# Patient Record
Sex: MALE | Race: WHITE | NOT HISPANIC OR LATINO | Employment: OTHER | URBAN - METROPOLITAN AREA
[De-identification: names, ages, dates, MRNs, and addresses within clinical notes are randomized per-mention and may not be internally consistent; named-entity substitution may affect disease eponyms.]

---

## 2022-01-03 ENCOUNTER — OFFICE VISIT (OUTPATIENT)
Dept: GASTROENTEROLOGY | Facility: CLINIC | Age: 77
End: 2022-01-03

## 2022-01-03 VITALS — WEIGHT: 190 LBS | DIASTOLIC BLOOD PRESSURE: 60 MMHG | SYSTOLIC BLOOD PRESSURE: 116 MMHG | HEART RATE: 66 BPM

## 2022-01-03 DIAGNOSIS — K62.5 RECTAL BLEEDING: Primary | ICD-10-CM

## 2022-01-03 DIAGNOSIS — D12.6 ADENOMATOUS POLYP OF COLON, UNSPECIFIED PART OF COLON: ICD-10-CM

## 2022-01-03 PROCEDURE — 99204 OFFICE O/P NEW MOD 45 MIN: CPT | Performed by: INTERNAL MEDICINE

## 2022-01-03 RX ORDER — HYDROCHLOROTHIAZIDE 12.5 MG/1
12.5 TABLET ORAL DAILY
COMMUNITY
Start: 2021-12-19

## 2022-01-03 RX ORDER — AMLODIPINE BESYLATE 10 MG/1
TABLET ORAL
COMMUNITY
Start: 2021-10-11

## 2022-01-03 RX ORDER — OMEPRAZOLE 40 MG/1
CAPSULE, DELAYED RELEASE ORAL
COMMUNITY

## 2022-01-03 RX ORDER — AMITRIPTYLINE HYDROCHLORIDE 10 MG/1
TABLET, FILM COATED ORAL
COMMUNITY

## 2022-01-03 RX ORDER — AMMONIUM LACTATE 12 G/100G
LOTION TOPICAL
COMMUNITY
Start: 2021-11-11

## 2022-01-03 RX ORDER — HYDROXYZINE HYDROCHLORIDE 25 MG/1
25-50 TABLET, FILM COATED ORAL
COMMUNITY
Start: 2021-12-27

## 2022-01-03 RX ORDER — ASPIRIN 81 MG/1
81 TABLET ORAL DAILY
COMMUNITY

## 2022-01-03 RX ORDER — MELOXICAM 15 MG/1
15 TABLET ORAL DAILY
COMMUNITY
Start: 2021-11-22

## 2022-01-03 RX ORDER — LISINOPRIL 10 MG/1
10 TABLET ORAL DAILY
COMMUNITY
Start: 2021-12-19

## 2022-01-03 RX ORDER — ATENOLOL 25 MG/1
25 TABLET ORAL DAILY
COMMUNITY
Start: 2021-11-21

## 2022-01-03 NOTE — PROGRESS NOTES
PCP:  Christophe Glez MD     No referring provider defined for this encounter.    Chief Complaint   Patient presents with   • Rectal Bleeding        HPI   Patient is a 76-year-old gentleman who had an episode of rectal bleeding.  He actually had several episodes with dark rectal bleeding beginning on 1231.  This was relatively painless other than the small amount of discomfort around the anal area.  He did not feel like he was tearing.  He was thought to possibly have hemorrhoids.  He had been evaluated in the emergency room at [facility].  His last colonoscopy was 9/4/2018.  This showed internal hemorrhoids as well as diverticulosis.  There was no evidence of colitis or polyps at that time.  He has a personal history of polyps.    No Known Allergies       Current Outpatient Medications:   •  aspirin 81 MG EC tablet, Take 81 mg by mouth Daily., Disp: , Rfl:   •  amitriptyline (ELAVIL) 10 MG tablet, amitriptyline 10 mg tablet, Disp: , Rfl:   •  amLODIPine (NORVASC) 10 MG tablet, , Disp: , Rfl:   •  ammonium lactate (LAC-HYDRIN) 12 % lotion, , Disp: , Rfl:   •  atenolol (TENORMIN) 25 MG tablet, Take 25 mg by mouth Daily., Disp: , Rfl:   •  betamethasone valerate (VALISONE) 0.1 % ointment, betamethasone valerate 0.1 % topical ointment  Apply sparingly to ear canals QD for one week, then Q week., Disp: , Rfl:   •  Diclofenac Sodium (VOLTAREN) 1 % gel gel, diclofenac 1 % topical gel, Disp: , Rfl:   •  hydroCHLOROthiazide (HYDRODIURIL) 12.5 MG tablet, Take 12.5 mg by mouth Daily., Disp: , Rfl:   •  hydrOXYzine (ATARAX) 25 MG tablet, Take 25-50 mg by mouth every night at bedtime., Disp: , Rfl:   •  lisinopril (PRINIVIL,ZESTRIL) 10 MG tablet, Take 10 mg by mouth Daily., Disp: , Rfl:   •  meloxicam (MOBIC) 15 MG tablet, Take 15 mg by mouth Daily., Disp: , Rfl:   •  omeprazole (priLOSEC) 40 MG capsule, omeprazole 40 mg capsule,delayed release  TAKE 1 CAPSULE BY MOUTH ONCE DAILY, Disp: , Rfl:      Past Medical History:    Diagnosis Date   • Hypertension        Past Surgical History:   Procedure Laterality Date   • CHOLECYSTECTOMY     • KNEE SURGERY Bilateral     knee replacement        Social History     Socioeconomic History   • Marital status:    Tobacco Use   • Smoking status: Never Smoker   • Smokeless tobacco: Never Used   Substance and Sexual Activity   • Alcohol use: Never   • Drug use: Never        Family History   Problem Relation Age of Onset   • Colon cancer Neg Hx    • Colon polyps Neg Hx         Review of Systems     Vitals:    01/03/22 1426   BP: 116/60   Pulse: 66        Physical Exam   General Appearance: Alert, in no acute distress   Head: Normocephalic, without obvious abnormality, atraumatic   Eyes: Lids and lashes normal, conjunctivae and sclerae normal, no icterus, no pallor, corneas clear, PERRLA   Ears: Ears appear intact with no abnormalities noted   Extremities: Moves all extremities well, no edema, no cyanosis, no redness   Skin: No bleeding, bruising or rash   Neurologic: Cranial nerves 2 - 12 grossly intact, no focal deficits         Diagnoses and all orders for this visit:    1. Rectal bleeding (Primary)    2. Adenomatous polyp of colon, unspecified part of colon    Impressions and plan #1 rectal bleeding: This may have been hemorrhoidal bleeding.  Ischemic colitis could cause a similar picture.  Diverticular bleeding may cause this as well although usually the blood is more bright red.  We talked about the options.  I think it would be best to reevaluate.  Its been about 3 and half years.  He is in agreement.  He otherwise option would be a conservative approach and take a watch and wait attitude but we would not know what was going on inside the colon.  In the distant past he had an episode of ulcerative areas noted in the colon that was thought to represent Crohn's disease.  This was done at the [facility] many years ago.  He has not had any further attacks from that standpoint which  makes Crohn's disease less likely.  His stools are typically loose.    Freddie Rai MD

## 2022-01-14 ENCOUNTER — OUTSIDE FACILITY SERVICE (OUTPATIENT)
Dept: GASTROENTEROLOGY | Facility: CLINIC | Age: 77
End: 2022-01-14

## 2022-01-14 PROCEDURE — 45378 DIAGNOSTIC COLONOSCOPY: CPT | Performed by: INTERNAL MEDICINE
